# Patient Record
Sex: FEMALE | Race: WHITE | NOT HISPANIC OR LATINO
[De-identification: names, ages, dates, MRNs, and addresses within clinical notes are randomized per-mention and may not be internally consistent; named-entity substitution may affect disease eponyms.]

---

## 2020-11-08 DIAGNOSIS — Z00.00 ENCOUNTER FOR GENERAL ADULT MEDICAL EXAMINATION W/OUT ABNORMAL FINDINGS: ICD-10-CM

## 2020-11-25 ENCOUNTER — APPOINTMENT (OUTPATIENT)
Dept: HEART AND VASCULAR | Facility: CLINIC | Age: 55
End: 2020-11-25

## 2021-10-29 ENCOUNTER — NON-APPOINTMENT (OUTPATIENT)
Age: 56
End: 2021-10-29

## 2021-10-29 ENCOUNTER — APPOINTMENT (OUTPATIENT)
Dept: HEART AND VASCULAR | Facility: CLINIC | Age: 56
End: 2021-10-29
Payer: COMMERCIAL

## 2021-10-29 VITALS
OXYGEN SATURATION: 96 % | HEART RATE: 63 BPM | BODY MASS INDEX: 25.76 KG/M2 | WEIGHT: 170 LBS | SYSTOLIC BLOOD PRESSURE: 126 MMHG | HEIGHT: 68 IN | DIASTOLIC BLOOD PRESSURE: 88 MMHG

## 2021-10-29 DIAGNOSIS — Z78.9 OTHER SPECIFIED HEALTH STATUS: ICD-10-CM

## 2021-10-29 DIAGNOSIS — Z84.89 FAMILY HISTORY OF OTHER SPECIFIED CONDITIONS: ICD-10-CM

## 2021-10-29 DIAGNOSIS — Z82.79 FAMILY HISTORY OF OTHER CONGENITAL MALFORMATIONS, DEFORMATIONS AND CHROMOSOMAL ABNORMALITIES: ICD-10-CM

## 2021-10-29 DIAGNOSIS — R42 DIZZINESS AND GIDDINESS: ICD-10-CM

## 2021-10-29 PROCEDURE — 99204 OFFICE O/P NEW MOD 45 MIN: CPT | Mod: 25

## 2021-10-29 PROCEDURE — 93000 ELECTROCARDIOGRAM COMPLETE: CPT

## 2021-10-29 PROCEDURE — 36415 COLL VENOUS BLD VENIPUNCTURE: CPT

## 2021-11-02 PROBLEM — Z82.79 FAMILY HISTORY OF BICUSPID AORTIC VALVE: Status: ACTIVE | Noted: 2021-11-02

## 2021-11-02 PROBLEM — Z78.9 NEVER SMOKED TOBACCO: Status: ACTIVE | Noted: 2021-11-02

## 2021-11-02 PROBLEM — R42 DIZZINESS: Status: ACTIVE | Noted: 2021-11-02

## 2021-11-02 PROBLEM — Z84.89 FAMILY HISTORY OF SUDDEN DEATH: Status: ACTIVE | Noted: 2021-11-02

## 2021-11-02 RX ORDER — LEVOTHYROXINE SODIUM 0.09 MG/1
88 TABLET ORAL
Qty: 30 | Refills: 0 | Status: ACTIVE | COMMUNITY
Start: 2020-09-28

## 2021-11-02 RX ORDER — LEVOTHYROXINE SODIUM 137 UG/1
TABLET ORAL
Refills: 0 | Status: DISCONTINUED | COMMUNITY

## 2021-11-02 NOTE — DISCUSSION/SUMMARY
[FreeTextEntry1] : The patient has a family history of a bicuspid aortic valve.  She has dyspnea on exertion and occasional dizziness.  She is also had edema.Her diastolic blood pressure is slightly high but is usually normal.  The EKG shows poor R wave progression which was done for dyspnea.  The patient is at risk for coronary artery disease.  She will undergo a calcium score.  She will return for an echocardiogram.  No medication is indicated at this time.

## 2021-11-02 NOTE — HISTORY OF PRESENT ILLNESS
[FreeTextEntry1] : Her father had an aortic valve replacement for bicuspid aortic valve.  Family member  at age 15 of heart disease.  The patient runs but has exertional asthma which leads to dyspnea.  She has gained weight.  She is dizzy upon arising when she is at a low weight.  She has edema after travel.  Her blood pressure is usually normal.  Her cholesterol has been good.  Her mother had stents in her 70s.

## 2021-11-19 ENCOUNTER — APPOINTMENT (OUTPATIENT)
Dept: HEART AND VASCULAR | Facility: CLINIC | Age: 56
End: 2021-11-19

## 2022-04-29 ENCOUNTER — APPOINTMENT (OUTPATIENT)
Dept: HEART AND VASCULAR | Facility: CLINIC | Age: 57
End: 2022-04-29
Payer: COMMERCIAL

## 2022-04-29 PROCEDURE — 99213 OFFICE O/P EST LOW 20 MIN: CPT | Mod: 95

## 2022-05-01 RX ORDER — FLUOXETINE HCL 10 MG
TABLET ORAL
Refills: 0 | Status: ACTIVE | COMMUNITY

## 2022-05-01 NOTE — DISCUSSION/SUMMARY
[FreeTextEntry1] : Patient has had edema.  She has chronic dyspnea on exertion but at times is quite active.  She will check her blood pressure and pulse and report back to us.  We will obtain the results of her cholesterol testing.  She will get a calcium score next week and we will decide about any further coronary evaluation.

## 2022-05-01 NOTE — HISTORY OF PRESENT ILLNESS
[Home] : at home, [unfilled] , at the time of the visit. [Medical Office: ([facility])___] : at the medical office located in  [Verbal consent obtained from patient] : the patient, [unfilled] [FreeTextEntry1] : The patient travels frequently.  She wears compression stockings.  She has edema which comes and goes.  She has had dyspnea climbing the stairs for years.  She skis and runs without difficulty.  Her weight is 173 pounds.  She reports that her recent cholesterol was normal.  She has not taken her blood pressure or pulse recently but her blood pressure is usually low.

## 2022-05-04 ENCOUNTER — OUTPATIENT (OUTPATIENT)
Dept: OUTPATIENT SERVICES | Facility: HOSPITAL | Age: 57
LOS: 1 days | End: 2022-05-04
Payer: SELF-PAY

## 2022-05-04 ENCOUNTER — APPOINTMENT (OUTPATIENT)
Dept: CT IMAGING | Facility: HOSPITAL | Age: 57
End: 2022-05-04
Payer: SELF-PAY

## 2022-05-04 PROCEDURE — 75571 CT HRT W/O DYE W/CA TEST: CPT | Mod: 26

## 2022-05-04 PROCEDURE — 75571 CT HRT W/O DYE W/CA TEST: CPT

## 2022-09-23 ENCOUNTER — NON-APPOINTMENT (OUTPATIENT)
Age: 57
End: 2022-09-23

## 2022-09-23 ENCOUNTER — APPOINTMENT (OUTPATIENT)
Dept: HEART AND VASCULAR | Facility: CLINIC | Age: 57
End: 2022-09-23

## 2022-09-23 VITALS
OXYGEN SATURATION: 98 % | SYSTOLIC BLOOD PRESSURE: 125 MMHG | TEMPERATURE: 97.8 F | DIASTOLIC BLOOD PRESSURE: 82 MMHG | HEART RATE: 53 BPM | BODY MASS INDEX: 26.52 KG/M2 | HEIGHT: 68 IN | WEIGHT: 175 LBS

## 2022-09-23 DIAGNOSIS — R06.09 OTHER FORMS OF DYSPNEA: ICD-10-CM

## 2022-09-23 DIAGNOSIS — R60.0 LOCALIZED EDEMA: ICD-10-CM

## 2022-09-23 DIAGNOSIS — R94.31 ABNORMAL ELECTROCARDIOGRAM [ECG] [EKG]: ICD-10-CM

## 2022-09-23 PROCEDURE — 93306 TTE W/DOPPLER COMPLETE: CPT

## 2022-09-23 PROCEDURE — 99214 OFFICE O/P EST MOD 30 MIN: CPT | Mod: 25

## 2022-09-23 PROCEDURE — 93000 ELECTROCARDIOGRAM COMPLETE: CPT

## 2022-09-25 PROBLEM — R06.09 DYSPNEA ON EXERTION: Status: ACTIVE | Noted: 2021-10-29

## 2022-09-25 PROBLEM — R60.0 EDEMA OF EXTREMITIES: Status: ACTIVE | Noted: 2021-10-29

## 2022-09-25 PROBLEM — R94.31 ABNORMAL EKG: Status: ACTIVE | Noted: 2022-09-25

## 2022-09-25 NOTE — DISCUSSION/SUMMARY
[FreeTextEntry1] : The patient has noted edema.  The EKG shows a possible inferior wall MI and an IVCD.  There is no significant change.  The echocardiogram shows an ejection fraction of 60 to 65% without significant valvular disease.  The edema is noncardiac.  The patient will elevate her legs and continue to avoid salt.  Her calcium score is high but her overall risk of cardiovascular disease is low.  She declines a stress test.  No cardiac medication is necessary. [EKG obtained to assist in diagnosis and management of assessed problem(s)] : EKG obtained to assist in diagnosis and management of assessed problem(s)

## 2022-09-25 NOTE — HISTORY OF PRESENT ILLNESS
[FreeTextEntry1] : The patient has noted left-sided edema with prolonged standing or plane flight.  Diet is low in salt.  She walks 1-2 hours without difficulty.  She has dyspnea climbing 3–4 flights of stairs but does not stop.

## 2024-02-05 ENCOUNTER — APPOINTMENT (OUTPATIENT)
Dept: HEART AND VASCULAR | Facility: CLINIC | Age: 59
End: 2024-02-05

## 2024-03-21 ENCOUNTER — APPOINTMENT (OUTPATIENT)
Dept: HEART AND VASCULAR | Facility: CLINIC | Age: 59
End: 2024-03-21

## 2024-08-01 ENCOUNTER — APPOINTMENT (OUTPATIENT)
Dept: HEART AND VASCULAR | Facility: CLINIC | Age: 59
End: 2024-08-01
Payer: COMMERCIAL

## 2024-08-01 VITALS
SYSTOLIC BLOOD PRESSURE: 116 MMHG | WEIGHT: 144 LBS | HEART RATE: 72 BPM | DIASTOLIC BLOOD PRESSURE: 78 MMHG | HEIGHT: 68 IN | BODY MASS INDEX: 21.82 KG/M2

## 2024-08-01 DIAGNOSIS — R94.31 ABNORMAL ELECTROCARDIOGRAM [ECG] [EKG]: ICD-10-CM

## 2024-08-01 PROCEDURE — 93351 STRESS TTE COMPLETE: CPT

## 2024-08-01 PROCEDURE — 99213 OFFICE O/P EST LOW 20 MIN: CPT | Mod: 25

## 2024-08-01 PROCEDURE — ZZZZZ: CPT | Mod: NC

## 2024-08-02 RX ORDER — SEMAGLUTIDE 2.4 MG/.75ML
2.4 INJECTION, SOLUTION SUBCUTANEOUS
Refills: 0 | Status: ACTIVE | COMMUNITY

## 2024-08-02 NOTE — DISCUSSION/SUMMARY
[FreeTextEntry1] : The patient has an abnormal EKG.  She is at risk for coronary artery disease.  The stress test is normal.  Significant ischemia is unlikely.  She has lost a great deal of weight.  Her cholesterol profile is excellent.  Her current weight is very good.

## 2024-08-02 NOTE — HISTORY OF PRESENT ILLNESS
[FreeTextEntry1] : In December the patient's cholesterol was 160, HDL 54 and .  She runs 30 minutes without difficulty.  Her diet is very good.  She has been taking Wegovy and lost 45 pounds.  She never has red meat and most of her diet is home-cooked.